# Patient Record
Sex: FEMALE | Race: ASIAN | NOT HISPANIC OR LATINO | URBAN - METROPOLITAN AREA
[De-identification: names, ages, dates, MRNs, and addresses within clinical notes are randomized per-mention and may not be internally consistent; named-entity substitution may affect disease eponyms.]

---

## 2020-08-18 ENCOUNTER — OFFICE (OUTPATIENT)
Dept: URBAN - METROPOLITAN AREA CLINIC 11 | Facility: CLINIC | Age: 60
End: 2020-08-18

## 2020-08-18 VITALS
OXYGEN SATURATION: 98 % | HEIGHT: 61 IN | SYSTOLIC BLOOD PRESSURE: 146 MMHG | HEART RATE: 57 BPM | WEIGHT: 141 LBS | DIASTOLIC BLOOD PRESSURE: 58 MMHG

## 2020-08-18 DIAGNOSIS — R93.3 ABNORMAL FINDINGS ON DIAGNOSTIC IMAGING OF OTHER PARTS OF DI: ICD-10-CM

## 2020-08-18 PROCEDURE — 99203 OFFICE O/P NEW LOW 30 MIN: CPT | Performed by: INTERNAL MEDICINE

## 2020-08-18 NOTE — SERVICENOTES
We discussed her prior findings of possible liver scarring noted on studies form 2015 and normal evaluatoin at the time including normal LFTs.  Recently she has had studies completed but the results were not avaible today (they have been requested).  With no particular sx/sx, I will hold on ordering f/u studies until we have them.  We did discussed the potential of an MRI or liver bx depending on the results.  As to her colon cancer screening, she had hyperplastic polyps in 2014 and will be due for f/u in 2024.

## 2020-08-18 NOTE — SERVICEHPINOTES
She presents for evaluation of abnormal LFTS and an abnormal u/s.  However, she did not have copies of her testing results. She stated that she has not been having issues with abdominal, n/v, f/c, icterus/jaundice or other new issues.  She stated that she does not use ETOH.  In 2015 she had imaging with a hemangioma of the spleen and some irregularity of the liver but no distinct lesions.  She was to ve had f/u but declined at the time. She has a hx of hepatis A and was negative for hep B and C.  Her LFTS were normal were in 2015.She had a benign colonoscopy with a hyperplastic polyp.

## 2020-08-25 ENCOUNTER — OFFICE (OUTPATIENT)
Dept: URBAN - METROPOLITAN AREA CLINIC 11 | Facility: CLINIC | Age: 60
End: 2020-08-25

## 2020-09-15 ENCOUNTER — OFFICE (OUTPATIENT)
Dept: URBAN - METROPOLITAN AREA CLINIC 11 | Facility: CLINIC | Age: 60
End: 2020-09-15

## 2020-09-15 VITALS
OXYGEN SATURATION: 99 % | SYSTOLIC BLOOD PRESSURE: 166 MMHG | WEIGHT: 139 LBS | HEIGHT: 61 IN | HEART RATE: 53 BPM | DIASTOLIC BLOOD PRESSURE: 56 MMHG

## 2020-09-15 DIAGNOSIS — K75.81 NONALCOHOLIC STEATOHEPATITIS (NASH): ICD-10-CM

## 2020-09-15 PROCEDURE — 99213 OFFICE O/P EST LOW 20 MIN: CPT | Performed by: INTERNAL MEDICINE

## 2020-09-15 NOTE — SERVICENOTES
We reviewed her studies and idscussed the findings of fatty liver disease/FIGUEROA. She does not appear to have significant fibrosis or changes of cirrhosis.  We discussed her new exercise plan and diet.  I would do her f/u ultrasound and fibroscan.  We did discuss a possible future liver bx as well as drug study options.  She was not interested in drugy study options for now. D/w pt and .

## 2020-09-15 NOTE — SERVICEHPINOTES
She stated that she has been doing well and has started working with a  three days a week. She hashad her LFTS and the AST/ALT were in the 40s.  She had a Fibroscore with possible F1 fibrosis and S1-2 steatosis possibly consistent with FIGUEROA.  She has not had other abdominal or other new difficulties. We reviewed her studies.

## 2020-09-21 ENCOUNTER — OFFICE (OUTPATIENT)
Dept: URBAN - METROPOLITAN AREA CLINIC 14 | Facility: CLINIC | Age: 60
End: 2020-09-21

## 2020-09-21 VITALS — HEIGHT: 61 IN

## 2020-09-21 DIAGNOSIS — K75.81 NONALCOHOLIC STEATOHEPATITIS (NASH): ICD-10-CM

## 2020-09-21 DIAGNOSIS — K76.0 FATTY (CHANGE OF) LIVER, NOT ELSEWHERE CLASSIFIED: ICD-10-CM

## 2020-09-21 PROCEDURE — 91200 LIVER ELASTOGRAPHY: CPT | Performed by: INTERNAL MEDICINE

## 2020-10-07 ENCOUNTER — OFFICE (OUTPATIENT)
Dept: URBAN - METROPOLITAN AREA CLINIC 19 | Facility: CLINIC | Age: 60
End: 2020-10-07

## 2020-10-07 DIAGNOSIS — K75.81 NONALCOHOLIC STEATOHEPATITIS (NASH): ICD-10-CM

## 2020-10-07 DIAGNOSIS — K76.0 FATTY (CHANGE OF) LIVER, NOT ELSEWHERE CLASSIFIED: ICD-10-CM

## 2020-10-07 PROCEDURE — 76700 US EXAM ABDOM COMPLETE: CPT | Performed by: INTERNAL MEDICINE

## 2021-01-25 ENCOUNTER — OFFICE (OUTPATIENT)
Dept: URBAN - METROPOLITAN AREA CLINIC 11 | Facility: CLINIC | Age: 61
End: 2021-01-25

## 2021-01-25 VITALS
DIASTOLIC BLOOD PRESSURE: 70 MMHG | HEIGHT: 61 IN | WEIGHT: 144 LBS | SYSTOLIC BLOOD PRESSURE: 178 MMHG | HEART RATE: 56 BPM

## 2021-01-25 DIAGNOSIS — E78.00 PURE HYPERCHOLESTEROLEMIA, UNSPECIFIED: ICD-10-CM

## 2021-01-25 DIAGNOSIS — K75.81 NONALCOHOLIC STEATOHEPATITIS (NASH): ICD-10-CM

## 2021-01-25 LAB
HEMOGLOBIN A1C: 5.9 % — HIGH (ref 4.8–5.6)
HEPATIC FUNCTION PANEL (7): ALBUMIN: 4.5 G/DL (ref 3.8–4.9)
HEPATIC FUNCTION PANEL (7): ALKALINE PHOSPHATASE: 90 IU/L (ref 39–117)
HEPATIC FUNCTION PANEL (7): ALT (SGPT): 30 IU/L (ref 0–32)
HEPATIC FUNCTION PANEL (7): AST (SGOT): 24 IU/L (ref 0–40)
HEPATIC FUNCTION PANEL (7): BILIRUBIN, DIRECT: 0.1 MG/DL (ref 0–0.4)
HEPATIC FUNCTION PANEL (7): BILIRUBIN, TOTAL: 0.3 MG/DL (ref 0–1.2)
HEPATIC FUNCTION PANEL (7): PROTEIN, TOTAL: 7.2 G/DL (ref 6–8.5)
INSULIN: 19.5 UIU/ML (ref 2.6–24.9)
LIPID PANEL: CHOLESTEROL, TOTAL: 210 MG/DL — HIGH (ref 100–199)
LIPID PANEL: HDL CHOLESTEROL: 53 MG/DL (ref 39–?)
LIPID PANEL: LDL CHOL CALC (NIH): 135 MG/DL — HIGH (ref 0–99)
LIPID PANEL: TRIGLYCERIDES: 123 MG/DL (ref 0–149)
LIPID PANEL: VLDL CHOLESTEROL CAL: 22 MG/DL (ref 5–40)

## 2021-01-25 PROCEDURE — 99213 OFFICE O/P EST LOW 20 MIN: CPT | Performed by: INTERNAL MEDICINE

## 2021-01-25 NOTE — SERVICENOTES
She has been doing well.  We discussed her weight gain and need for weight loss. She will need her f/u for her insulin levels and cholesterol as well as her LFTS.  She is not a candidate currently for a drug study.

## 2021-01-25 NOTE — SERVICEHPINOTES
She rpesents for f/u of her FIGUEROA/NAFLD.  She has been doign well but has gained a few lbs since her last visit in September.  She has not reported any new GI issues.  She has had normal stools.  She has not had issues with abdomina pain, n/v, reflux, or such.We reviewed her Fibroscan resutls (F0-1 with steatosis) and u/s with steatosis.  She has a hx of hypercholesterolemia and was on a statin for a short time.  She is currently on a diet plan with dr. Kilgore.  She has f/u with him in March. BR

## 2025-05-12 ENCOUNTER — OFFICE (OUTPATIENT)
Dept: URBAN - METROPOLITAN AREA PATHOLOGY 12 | Facility: PATHOLOGY | Age: 65
End: 2025-05-12
Payer: COMMERCIAL

## 2025-05-12 ENCOUNTER — AMBULATORY SURGICAL CENTER (OUTPATIENT)
Dept: URBAN - METROPOLITAN AREA SURGERY 3 | Facility: SURGERY | Age: 65
End: 2025-05-12
Payer: COMMERCIAL

## 2025-05-12 VITALS
RESPIRATION RATE: 11 BRPM | SYSTOLIC BLOOD PRESSURE: 123 MMHG | TEMPERATURE: 97.2 F | SYSTOLIC BLOOD PRESSURE: 104 MMHG | DIASTOLIC BLOOD PRESSURE: 69 MMHG | HEART RATE: 55 BPM | HEART RATE: 61 BPM | SYSTOLIC BLOOD PRESSURE: 177 MMHG | DIASTOLIC BLOOD PRESSURE: 71 MMHG | DIASTOLIC BLOOD PRESSURE: 58 MMHG | OXYGEN SATURATION: 97 % | HEART RATE: 55 BPM | RESPIRATION RATE: 17 BRPM | OXYGEN SATURATION: 99 % | HEART RATE: 64 BPM | DIASTOLIC BLOOD PRESSURE: 69 MMHG | OXYGEN SATURATION: 99 % | DIASTOLIC BLOOD PRESSURE: 53 MMHG | OXYGEN SATURATION: 96 % | WEIGHT: 138.6 LBS | OXYGEN SATURATION: 96 % | RESPIRATION RATE: 12 BRPM | SYSTOLIC BLOOD PRESSURE: 104 MMHG | TEMPERATURE: 97.5 F | RESPIRATION RATE: 16 BRPM | WEIGHT: 138.6 LBS | HEIGHT: 61 IN | HEIGHT: 61 IN | SYSTOLIC BLOOD PRESSURE: 177 MMHG | SYSTOLIC BLOOD PRESSURE: 102 MMHG | SYSTOLIC BLOOD PRESSURE: 123 MMHG | SYSTOLIC BLOOD PRESSURE: 102 MMHG | RESPIRATION RATE: 11 BRPM | DIASTOLIC BLOOD PRESSURE: 58 MMHG | SYSTOLIC BLOOD PRESSURE: 95 MMHG | DIASTOLIC BLOOD PRESSURE: 71 MMHG | HEART RATE: 61 BPM | SYSTOLIC BLOOD PRESSURE: 95 MMHG | HEART RATE: 64 BPM | TEMPERATURE: 97.2 F | TEMPERATURE: 97.5 F | DIASTOLIC BLOOD PRESSURE: 53 MMHG | OXYGEN SATURATION: 97 % | RESPIRATION RATE: 17 BRPM | RESPIRATION RATE: 12 BRPM | RESPIRATION RATE: 16 BRPM

## 2025-05-12 DIAGNOSIS — K63.5 POLYP OF COLON: ICD-10-CM

## 2025-05-12 DIAGNOSIS — D12.3 BENIGN NEOPLASM OF TRANSVERSE COLON: ICD-10-CM

## 2025-05-12 DIAGNOSIS — Z12.11 ENCOUNTER FOR SCREENING FOR MALIGNANT NEOPLASM OF COLON: ICD-10-CM

## 2025-05-12 PROCEDURE — 45385 COLONOSCOPY W/LESION REMOVAL: CPT | Mod: 33 | Performed by: INTERNAL MEDICINE

## 2025-05-12 PROCEDURE — 88305 TISSUE EXAM BY PATHOLOGIST: CPT | Performed by: STUDENT IN AN ORGANIZED HEALTH CARE EDUCATION/TRAINING PROGRAM

## 2025-05-12 PROCEDURE — 45380 COLONOSCOPY AND BIOPSY: CPT | Mod: 59 | Performed by: INTERNAL MEDICINE
